# Patient Record
(demographics unavailable — no encounter records)

---

## 2025-01-02 NOTE — ASSESSMENT
[FreeTextEntry1] : Well-healed scar on neck - observation  macular traumatic purpura, left arm - due to skin fragility nothing further to do

## 2025-01-02 NOTE — HISTORY OF PRESENT ILLNESS
[FreeTextEntry1] : followup [de-identified] : hx of BCC on neck s/p excision with Dr. Rosado in 6/2024 developed rash on neck  also developed rash on left arm